# Patient Record
Sex: FEMALE | Race: WHITE | NOT HISPANIC OR LATINO | Employment: FULL TIME | URBAN - METROPOLITAN AREA
[De-identification: names, ages, dates, MRNs, and addresses within clinical notes are randomized per-mention and may not be internally consistent; named-entity substitution may affect disease eponyms.]

---

## 2023-09-06 RX ORDER — CYCLOBENZAPRINE HCL 10 MG
10 TABLET ORAL 3 TIMES DAILY
Qty: 30 TABLET | Refills: 1 | Status: CANCELLED | OUTPATIENT
Start: 2023-09-06

## 2023-09-06 RX ORDER — HYDROXYZINE HYDROCHLORIDE 25 MG/1
25 TABLET, FILM COATED ORAL 3 TIMES DAILY PRN
Qty: 90 TABLET | Refills: 1 | Status: CANCELLED | OUTPATIENT
Start: 2023-09-06

## 2023-10-09 RX ORDER — CYCLOBENZAPRINE HCL 10 MG
10 TABLET ORAL 3 TIMES DAILY
Qty: 30 TABLET | Refills: 1 | Status: CANCELLED | OUTPATIENT
Start: 2023-09-06

## 2023-10-09 RX ORDER — HYDROXYZINE HYDROCHLORIDE 25 MG/1
25 TABLET, FILM COATED ORAL 3 TIMES DAILY PRN
Qty: 90 TABLET | Refills: 1 | Status: CANCELLED | OUTPATIENT
Start: 2023-09-06

## 2024-06-14 ENCOUNTER — OFFICE VISIT (OUTPATIENT)
Dept: GASTROENTEROLOGY | Facility: CLINIC | Age: 49
End: 2024-06-14
Payer: COMMERCIAL

## 2024-06-14 VITALS
DIASTOLIC BLOOD PRESSURE: 78 MMHG | HEIGHT: 59 IN | WEIGHT: 136.2 LBS | SYSTOLIC BLOOD PRESSURE: 112 MMHG | BODY MASS INDEX: 27.46 KG/M2

## 2024-06-14 DIAGNOSIS — K92.1 HEMATOCHEZIA: Primary | ICD-10-CM

## 2024-06-14 DIAGNOSIS — K58.1 IRRITABLE BOWEL SYNDROME WITH CONSTIPATION: ICD-10-CM

## 2024-06-14 DIAGNOSIS — R63.4 UNINTENTIONAL WEIGHT LOSS: ICD-10-CM

## 2024-06-14 RX ORDER — PLECANATIDE 3 MG/1
3 TABLET ORAL DAILY
Qty: 30 TABLET | Refills: 11 | Status: SHIPPED | OUTPATIENT
Start: 2024-06-14

## 2024-06-14 NOTE — PROGRESS NOTES
Marisela Farah is a 48 y.o. female with a past medical history of IBS + noted below who presents for evaluation of   Chief Complaint   Patient presents with    Constipation    Rectal Bleeding       Subjective     # IBS-C   # Hematochezia   # Unintentional Weight Loss   - Reports worsening constipation since 3/2024. Further described as having a BM every 3 days. Has tried OTC laxatives with minimal relief. Reports she is undergoing menopause which she believe may be contributing to her worsening constipation.   - Also reports hematochezia ongoing since 3/2024 further described as bright red blood noted in the toilet bowl and with wiping with toilet paper.   - Endorses associated abdominal bloating.   - Has had unintentional weight loss of 10 lbs.   - Reports her last colonoscopy was at [facility] in 2001.   - Reports her paternal GM was diagnosed with CRC in her 70s.                 Past Medical History:   Diagnosis Date    Anxiety     Clotting disorder 03/24    Hyperlipidemia 2022    Hypertension 2022    Irritable bowel syndrome 2001         Current Outpatient Medications:     atomoxetine (Strattera) 40 MG capsule, Take 1 capsule by mouth Daily for ADHD., Disp: 90 capsule, Rfl: 1    chlorthalidone (HYGROTON) 25 MG tablet, Take 1 tablet by mouth Daily., Disp: 90 tablet, Rfl: 2    clotrimazole-betamethasone (LOTRISONE) 1-0.05 % cream, clotrimazole-betamethasone 1 %-0.05 % topical cream  APPLY TO THE AFFECTED AND SURROUNDING AREAS OF SKIN BY TOPICAL ROUTE 2 TIMES PER DAY IN THE MORNING AND EVENING FOR 2 WEEKS, Disp: , Rfl:     cyclobenzaprine (FLEXERIL) 10 MG tablet, Take 1 tablet by mouth 3 (Three) Times a Day As Needed., Disp: 30 tablet, Rfl: 1    FLUoxetine (PROzac) 40 MG capsule, Take 1 capsule by mouth Daily., Disp: 30 capsule, Rfl: 6    hydrOXYzine (ATARAX) 25 MG tablet, Take 1 tablet by mouth 3 (Three) Times a Day As Needed., Disp: 90 tablet, Rfl: 1    nystatin (MYCOSTATIN) 902283 UNIT/GM powder, nystatin  100,000 unit/gram topical powder  APPLY TO THE AFFECTED AREA(S) BY TOPICAL ROUTE 2 TIMES PER DAY, Disp: , Rfl:     rOPINIRole (REQUIP) 2 MG tablet, Take 1 tablet by mouth every night at bedtime., Disp: 90 tablet, Rfl: 3    ubrogepant (Ubrelvy) 100 MG tablet, Take 1 tablet by mouth at onset of headache. may repeat in 2 hours if needed. max 2 tablets per 24 hours, Disp: 10 tablet, Rfl: 1    valACYclovir (VALTREX) 500 MG tablet, Take 1 tablet by mouth Daily., Disp: 30 tablet, Rfl: 11    Plecanatide (Trulance) 3 MG tablet, Take 1 tablet by mouth Daily., Disp: 30 tablet, Rfl: 11    Allergies   Allergen Reactions    Sulfa Antibiotics Rash and Itching       Social History     Socioeconomic History    Marital status:    Tobacco Use    Smoking status: Never     Passive exposure: Never    Smokeless tobacco: Never    Tobacco comments:     NA   Vaping Use    Vaping status: Never Used   Substance and Sexual Activity    Alcohol use: No    Drug use: Never    Sexual activity: Yes     Partners: Male     Birth control/protection: None       Family History   Problem Relation Age of Onset    Colon polyps Maternal Grandfather     Colon cancer Paternal Grandmother         Colon cancer age 67       Objective     Vitals:    06/14/24 1558   BP: 112/78         06/14/24  1558   Weight: 61.8 kg (136 lb 3.2 oz)     Body mass index is 27.51 kg/m².    Physical Exam  Vitals reviewed.   Constitutional:       Appearance: Normal appearance.   HENT:      Head: Normocephalic and atraumatic.   Eyes:      Extraocular Movements: Extraocular movements intact.      Conjunctiva/sclera: Conjunctivae normal.   Cardiovascular:      Rate and Rhythm: Normal rate and regular rhythm.      Heart sounds: Normal heart sounds.   Pulmonary:      Effort: Pulmonary effort is normal.      Breath sounds: Normal breath sounds.   Abdominal:      General: Abdomen is flat. Bowel sounds are normal. There is no distension.      Palpations: Abdomen is soft.      Tenderness:  There is no abdominal tenderness.   Neurological:      Mental Status: She is alert.   Psychiatric:         Mood and Affect: Mood normal.         Behavior: Behavior normal.         WBC   Date Value Ref Range Status   11/16/2020 10.12 4.5 - 11.0 10*3/uL Final     RBC   Date Value Ref Range Status   11/16/2020 4.38 4.0 - 5.2 10*6/uL Final     Hemoglobin   Date Value Ref Range Status   11/16/2020 14.0 12.0 - 16.0 g/dL Final     Hematocrit   Date Value Ref Range Status   11/16/2020 40.6 36.0 - 46.0 % Final     MCV   Date Value Ref Range Status   11/16/2020 92.7 80.0 - 100.0 fL Final     MCH   Date Value Ref Range Status   11/16/2020 32.0 26.0 - 34.0 pg Final     MCHC   Date Value Ref Range Status   11/16/2020 34.5 31.0 - 37.0 g/dL Final     RDW   Date Value Ref Range Status   11/16/2020 12.7 12.0 - 16.8 % Final     MPV   Date Value Ref Range Status   11/16/2020 8.6 8.4 - 12.4 fL Final     Platelets   Date Value Ref Range Status   11/16/2020 374 140 - 440 10*3/uL Final     Neutrophil Rel %   Date Value Ref Range Status   11/16/2020 57.7 45 - 80 % Final     Lymphocyte Rel %   Date Value Ref Range Status   11/16/2020 29.3 15 - 50 % Final     Monocyte Rel %   Date Value Ref Range Status   11/16/2020 8.5 0 - 15 % Final     Eosinophil %   Date Value Ref Range Status   11/16/2020 3.5 0 - 7 % Final     Basophil Rel %   Date Value Ref Range Status   11/16/2020 0.7 0 - 2 % Final     Immature Grans %   Date Value Ref Range Status   11/16/2020 0.3 0.0 - 1.0 % Final     Neutrophils Absolute   Date Value Ref Range Status   11/16/2020 5.84 2.0 - 8.8 10*3/uL Final     Lymphocytes Absolute   Date Value Ref Range Status   11/16/2020 2.97 0.7 - 5.5 10*3/uL Final     Monocytes Absolute   Date Value Ref Range Status   11/16/2020 0.86 0.0 - 1.7 10*3/uL Final     Eosinophils Absolute   Date Value Ref Range Status   11/16/2020 0.35 0.0 - 0.8 10*3/uL Final     Basophils Absolute   Date Value Ref Range Status   11/16/2020 0.07 0.0 - 0.2 10*3/uL  "Final     Immature Grans, Absolute   Date Value Ref Range Status   11/16/2020 0.03 0.00 - 0.10 10*3/uL Final     nRBC   Date Value Ref Range Status   11/16/2020 0 0 /100(WBC) Final       No results found for: \"GLUCOSE\", \"NA\", \"K\", \"CO2\", \"CL\", \"ANIONGAP\", \"CREATININE\", \"BUN\", \"BCR\", \"CALCIUM\", \"EGFRIFNONA\", \"ALKPHOS\", \"PROTEINTOT\", \"ALT\", \"AST\", \"BILITOT\", \"ALBUMIN\", \"GLOB\", \"LABIL2\"      Imaging Results (Last 7 Days)       ** No results found for the last 168 hours. **                   Assessment & Plan     Diagnoses and all orders for this visit:    1. Hematochezia (Primary)  Assessment & Plan:  - Schedule colonoscopy to further evaluate     Orders:  -     Case Request; Standing  -     Verify bowel prep was successful; Standing  -     Give tap water enema if bowel prep was insufficient; Standing  -     Obtain Informed Consent; Standing  -     Follow Anesthesia Guidelines / Protocol; Standing  -     Case Request  -     Case Request; Standing  -     Obtain Informed Consent; Standing  -     Follow Anesthesia Guidelines / Protocol; Standing  -     Case Request    2. Irritable bowel syndrome with constipation  Assessment & Plan:  - Start Trulance 3 mg QD       Orders:  -     Plecanatide (Trulance) 3 MG tablet; Take 1 tablet by mouth Daily.  Dispense: 30 tablet; Refill: 11    3. Unintentional weight loss  Assessment & Plan:  - Schedule EGD and colonoscopy to further evaluate     Orders:  -     Case Request; Standing  -     Obtain Informed Consent; Standing  -     Follow Anesthesia Guidelines / Protocol; Standing  -     Case Request      RTC in 3 months     I have discussed the above plan with the patient.  They verbalize understanding and are in agreement with the plan.  They have been advised to contact the office for any questions, concerns, or changes related to their health.        "

## 2024-06-28 ENCOUNTER — ANESTHESIA EVENT (OUTPATIENT)
Dept: PERIOP | Facility: HOSPITAL | Age: 49
End: 2024-06-28
Payer: COMMERCIAL

## 2024-07-03 ENCOUNTER — HOSPITAL ENCOUNTER (OUTPATIENT)
Facility: HOSPITAL | Age: 49
Setting detail: HOSPITAL OUTPATIENT SURGERY
Discharge: HOME | End: 2024-07-03
Attending: STUDENT IN AN ORGANIZED HEALTH CARE EDUCATION/TRAINING PROGRAM | Admitting: STUDENT IN AN ORGANIZED HEALTH CARE EDUCATION/TRAINING PROGRAM
Payer: COMMERCIAL

## 2024-07-03 ENCOUNTER — ANESTHESIA (OUTPATIENT)
Dept: PERIOP | Facility: HOSPITAL | Age: 49
End: 2024-07-03
Payer: COMMERCIAL

## 2024-07-03 VITALS
SYSTOLIC BLOOD PRESSURE: 104 MMHG | BODY MASS INDEX: 26.25 KG/M2 | WEIGHT: 130.2 LBS | HEIGHT: 59 IN | TEMPERATURE: 97.6 F | HEART RATE: 84 BPM | RESPIRATION RATE: 14 BRPM | DIASTOLIC BLOOD PRESSURE: 71 MMHG | OXYGEN SATURATION: 97 %

## 2024-07-03 DIAGNOSIS — K92.1 HEMATOCHEZIA: ICD-10-CM

## 2024-07-03 DIAGNOSIS — R63.4 UNINTENTIONAL WEIGHT LOSS: ICD-10-CM

## 2024-07-03 PROCEDURE — 25010000002 PROPOFOL 200 MG/20ML EMULSION: Performed by: NURSE ANESTHETIST, CERTIFIED REGISTERED

## 2024-07-03 PROCEDURE — 25810000003 LACTATED RINGERS PER 1000 ML: Performed by: NURSE ANESTHETIST, CERTIFIED REGISTERED

## 2024-07-03 RX ORDER — LINACLOTIDE 145 UG/1
145 CAPSULE, GELATIN COATED ORAL
Qty: 30 CAPSULE | Refills: 5 | Status: SHIPPED | OUTPATIENT
Start: 2024-07-03 | End: 2024-07-05

## 2024-07-03 RX ORDER — SODIUM CHLORIDE 0.9 % (FLUSH) 0.9 %
10 SYRINGE (ML) INJECTION EVERY 12 HOURS SCHEDULED
Status: DISCONTINUED | OUTPATIENT
Start: 2024-07-03 | End: 2024-07-03 | Stop reason: HOSPADM

## 2024-07-03 RX ORDER — PROPOFOL 10 MG/ML
INJECTION, EMULSION INTRAVENOUS AS NEEDED
Status: DISCONTINUED | OUTPATIENT
Start: 2024-07-03 | End: 2024-07-03 | Stop reason: SURG

## 2024-07-03 RX ORDER — ONDANSETRON 2 MG/ML
4 INJECTION INTRAMUSCULAR; INTRAVENOUS ONCE AS NEEDED
Status: DISCONTINUED | OUTPATIENT
Start: 2024-07-03 | End: 2024-07-03 | Stop reason: HOSPADM

## 2024-07-03 RX ORDER — SODIUM CHLORIDE 9 MG/ML
40 INJECTION, SOLUTION INTRAVENOUS AS NEEDED
Status: DISCONTINUED | OUTPATIENT
Start: 2024-07-03 | End: 2024-07-03 | Stop reason: HOSPADM

## 2024-07-03 RX ORDER — CHLORTHALIDONE 25 MG/1
25 TABLET ORAL DAILY
COMMUNITY

## 2024-07-03 RX ORDER — LIDOCAINE HYDROCHLORIDE 20 MG/ML
INJECTION, SOLUTION INFILTRATION; PERINEURAL AS NEEDED
Status: DISCONTINUED | OUTPATIENT
Start: 2024-07-03 | End: 2024-07-03 | Stop reason: SURG

## 2024-07-03 RX ORDER — SODIUM CHLORIDE 0.9 % (FLUSH) 0.9 %
10 SYRINGE (ML) INJECTION AS NEEDED
Status: DISCONTINUED | OUTPATIENT
Start: 2024-07-03 | End: 2024-07-03 | Stop reason: HOSPADM

## 2024-07-03 RX ORDER — SODIUM CHLORIDE, SODIUM LACTATE, POTASSIUM CHLORIDE, CALCIUM CHLORIDE 600; 310; 30; 20 MG/100ML; MG/100ML; MG/100ML; MG/100ML
100 INJECTION, SOLUTION INTRAVENOUS ONCE
Status: DISCONTINUED | OUTPATIENT
Start: 2024-07-03 | End: 2024-07-03 | Stop reason: HOSPADM

## 2024-07-03 RX ORDER — SODIUM CHLORIDE, SODIUM LACTATE, POTASSIUM CHLORIDE, CALCIUM CHLORIDE 600; 310; 30; 20 MG/100ML; MG/100ML; MG/100ML; MG/100ML
9 INJECTION, SOLUTION INTRAVENOUS CONTINUOUS
Status: DISCONTINUED | OUTPATIENT
Start: 2024-07-03 | End: 2024-07-03 | Stop reason: HOSPADM

## 2024-07-03 RX ORDER — LIDOCAINE HYDROCHLORIDE 10 MG/ML
0.5 INJECTION, SOLUTION INFILTRATION; PERINEURAL ONCE AS NEEDED
Status: DISCONTINUED | OUTPATIENT
Start: 2024-07-03 | End: 2024-07-03 | Stop reason: HOSPADM

## 2024-07-03 RX ADMIN — PROPOFOL 50 MG: 10 INJECTION, EMULSION INTRAVENOUS at 13:55

## 2024-07-03 RX ADMIN — PROPOFOL 50 MG: 10 INJECTION, EMULSION INTRAVENOUS at 14:07

## 2024-07-03 RX ADMIN — PROPOFOL 50 MG: 10 INJECTION, EMULSION INTRAVENOUS at 14:00

## 2024-07-03 RX ADMIN — PROPOFOL 50 MG: 10 INJECTION, EMULSION INTRAVENOUS at 13:58

## 2024-07-03 RX ADMIN — LIDOCAINE HYDROCHLORIDE 80 MG: 20 INJECTION, SOLUTION INFILTRATION; PERINEURAL at 13:51

## 2024-07-03 RX ADMIN — PROPOFOL 50 MG: 10 INJECTION, EMULSION INTRAVENOUS at 14:05

## 2024-07-03 RX ADMIN — PROPOFOL 100 MG: 10 INJECTION, EMULSION INTRAVENOUS at 13:51

## 2024-07-03 RX ADMIN — PROPOFOL 50 MG: 10 INJECTION, EMULSION INTRAVENOUS at 14:02

## 2024-07-03 RX ADMIN — PROPOFOL 50 MG: 10 INJECTION, EMULSION INTRAVENOUS at 13:53

## 2024-07-03 RX ADMIN — SODIUM CHLORIDE, POTASSIUM CHLORIDE, SODIUM LACTATE AND CALCIUM CHLORIDE 9 ML/HR: 600; 310; 30; 20 INJECTION, SOLUTION INTRAVENOUS at 10:52

## 2024-07-03 NOTE — ANESTHESIA PREPROCEDURE EVALUATION
Anesthesia Evaluation     Patient summary reviewed and Nursing notes reviewed   no history of anesthetic complications:   NPO Solid Status: > 8 hours  NPO Liquid Status: > 8 hours           Airway   Mallampati: II  TM distance: >3 FB  Neck ROM: full  No difficulty expected  Dental - normal exam     Pulmonary - negative pulmonary ROS and normal exam    breath sounds clear to auscultation  Cardiovascular - normal exam  Exercise tolerance: good (4-7 METS)    Rhythm: regular  Rate: normal    (+) hypertension  (-) hyperlipidemia      Neuro/Psych  (+) psychiatric history Anxiety and Depression  GI/Hepatic/Renal/Endo    (+) GERD    Musculoskeletal (-) negative ROS    Abdominal  - normal exam   Substance History - negative use     OB/GYN negative ob/gyn ROS         Other - negative ROS                         Anesthesia Plan    ASA 2     MAC     intravenous induction     Anesthetic plan, risks, benefits, and alternatives have been provided, discussed and informed consent has been obtained with: patient.  Pre-procedure education provided  Use of blood products discussed with patient  Consented to blood products.    Plan discussed with CRNA.        CODE STATUS:

## 2024-07-03 NOTE — ANESTHESIA POSTPROCEDURE EVALUATION
Patient: April ENRIQUE Farah    Procedure Summary       Date: 07/03/24 Room / Location: [facility] ENDOSCOPY 2 /  [facility] OR    Anesthesia Start: 1345 Anesthesia Stop: 1413    Procedures:       ESOPHAGOGASTRODUODENOSCOPY (Esophagus)      COLONOSCOPY Diagnosis:       Hematochezia      Unintentional weight loss      (Hematochezia [K92.1])      (Unintentional weight loss [R63.4])    Surgeons: Fernando Rausch MD Provider: Nadya Stevenson CRNA    Anesthesia Type: MAC ASA Status: 2            Anesthesia Type: MAC    Vitals  Vitals Value Taken Time   /69 07/03/24 1440   Temp 97.6 °F (36.4 °C) 07/03/24 1414   Pulse 84 07/03/24 1450   Resp 11 07/03/24 1414   SpO2 97 % 07/03/24 1450   Vitals shown include unfiled device data.        Post Anesthesia Care and Evaluation    Patient location during evaluation: PHASE II  Patient participation: complete - patient participated  Level of consciousness: awake and alert  Pain score: 0  Pain management: adequate    Airway patency: patent  Anesthetic complications: No anesthetic complications  PONV Status: none  Cardiovascular status: acceptable  Respiratory status: acceptable  Hydration status: acceptable

## 2024-07-03 NOTE — H&P
Patient Care Team:  Yolanda Comer APRN as PCP - General (Family Medicine)    CHIEF COMPLAINT:  unintentional weight loss & hematochezia.    HISTORY OF PRESENT ILLNESS:  EGD for unintentional weight loss.   C/s for unintentional weight loss & hematochezia.    Past Medical History:   Diagnosis Date    Anxiety     Hyperlipidemia 2022    Hypertension 2022    Irritable bowel syndrome 2001     Past Surgical History:   Procedure Laterality Date    COLONOSCOPY  2001    JOINT REPLACEMENT       Family History   Problem Relation Age of Onset    Colon polyps Maternal Grandfather     Colon cancer Paternal Grandmother         Colon cancer age 67     Social History     Tobacco Use    Smoking status: Never     Passive exposure: Never    Smokeless tobacco: Never    Tobacco comments:     NA   Vaping Use    Vaping status: Never Used   Substance Use Topics    Alcohol use: No    Drug use: Never     Medications Prior to Admission   Medication Sig Dispense Refill Last Dose    chlorthalidone (HYGROTON) 25 MG tablet Take 1 tablet by mouth Daily.   7/3/2024    cyclobenzaprine (FLEXERIL) 10 MG tablet Take 1 tablet by mouth 3 (Three) Times a Day As Needed. 30 tablet 1 Past Month    FLUoxetine (PROzac) 40 MG capsule Take 1 capsule by mouth Daily. 30 capsule 6 7/3/2024    hydrOXYzine (ATARAX) 25 MG tablet Take 1 tablet by mouth 3 (Three) Times a Day As Needed. 90 tablet 1 Past Month    ubrogepant (Ubrelvy) 100 MG tablet Take 1 tablet by mouth at onset of headache. may repeat in 2 hours if needed. max 2 tablets per 24 hours 10 tablet 1 Past Week    valACYclovir (VALTREX) 500 MG tablet Take 1 tablet by mouth Daily. 30 tablet 11 Past Month    Viloxazine HCl ER (Qelbree) 200 MG capsule sustained-release 24 hr Take 1 capsule by mouth Daily for ADHD. 30 capsule 0 7/3/2024    chlorthalidone (HYGROTON) 25 MG tablet Take 1 tablet by mouth Daily. 90 tablet 2     clotrimazole-betamethasone (LOTRISONE) 1-0.05 % cream clotrimazole-betamethasone 1 %-0.05 %  "topical cream   APPLY TO THE AFFECTED AND SURROUNDING AREAS OF SKIN BY TOPICAL ROUTE 2 TIMES PER DAY IN THE MORNING AND EVENING FOR 2 WEEKS       nystatin (MYCOSTATIN) 597648 UNIT/GM powder nystatin 100,000 unit/gram topical powder   APPLY TO THE AFFECTED AREA(S) BY TOPICAL ROUTE 2 TIMES PER DAY       Plecanatide (Trulance) 3 MG tablet Take 1 tablet by mouth Daily. 30 tablet 11 6/26/2024    rOPINIRole (REQUIP) 2 MG tablet Take 1 tablet by mouth every night at bedtime. 90 tablet 3 7/1/2024     Allergies:  Sulfa antibiotics    REVIEW OF SYSTEMS:  Please see the above history of present illness for pertinent positives and negatives.  The remainder of the patient's systems have been reviewed and are negative.     Vital Signs  Temp:  [97.8 °F (36.6 °C)] 97.8 °F (36.6 °C)  Heart Rate:  [99] 99  Resp:  [12] 12  BP: (129)/(95) 129/95    Flowsheet Rows      Flowsheet Row First Filed Value   Admission Height 149.9 cm (59\") Documented at 07/03/2024 1034   Admission Weight 59.1 kg (130 lb 3.2 oz) Documented at 07/03/2024 1034             Physical Exam:  Physical Exam   Constitutional: Patient appears well-developed and well-nourished and in no acute distress   HEENT:   Head: Normocephalic and atraumatic.   Eyes:  Pupils are equal, round, and reactive to light. EOM are intact. Sclerae are anicteric and non-injected.  Mouth and Throat: Patient has moist mucous membranes. Oropharynx is clear of any erythema or exudate.     Neck: Neck supple. No JVD present. No thyromegaly present. No lymphadenopathy present.  Cardiovascular: Regular rate, regular rhythm, S1 normal and S2 normal.  Exam reveals no gallop and no friction rub.  No murmur heard.  Pulmonary/Chest: Lungs are clear to auscultation bilaterally. No respiratory distress. No wheezes. No rhonchi. No rales.   Abdominal: Soft. Bowel sounds are normal. No distension and no mass. There is no hepatosplenomegaly. There is no tenderness.   Musculoskeletal: Normal Muscle " tone  Extremities: No edema. Pulses are palpable in all 4 extremities.  Neurological: Patient is alert and oriented to person, place, and time. Cranial nerves II-XII are grossly intact with no focal deficits.  Skin: Skin is warm. No rash noted. Nails show no clubbing.  No cyanosis or erythema.    Debilities/Disabilities Identified: None  Emotional Behavior: Appropriate     Results Review:   I reviewed the patient's new clinical results.    Lab Results (most recent)       None            Imaging Results (Most Recent)       None          reviewed    ECG/EMG Results (most recent)       None          reviewed    Assessment & Plan   unintentional weight loss & hematochezia. /  EGD and colonoscopy      I discussed the patient's findings and my recommendations with patient.     Fernando Rausch MD  07/03/24  13:49 EDT    Time: 10 min prior to procedure.

## 2024-07-05 ENCOUNTER — TELEPHONE (OUTPATIENT)
Dept: GASTROENTEROLOGY | Facility: CLINIC | Age: 49
End: 2024-07-05
Payer: COMMERCIAL

## 2024-07-05 DIAGNOSIS — K58.1 IRRITABLE BOWEL SYNDROME WITH CONSTIPATION: Primary | ICD-10-CM

## 2024-07-05 RX ORDER — TENAPANOR HYDROCHLORIDE 53.2 MG/1
50 TABLET ORAL 2 TIMES DAILY
Qty: 60 TABLET | Refills: 11 | Status: SHIPPED | OUTPATIENT
Start: 2024-07-05

## 2024-07-05 NOTE — TELEPHONE ENCOUNTER
Saw Dr. Rausch on 07/03/2024 for EGD.  Linzess was give, but cost too much.  Did give Trulance, but again cost too much.  Would like 3rd option, please. Pharmacy information is correct.  Please review.

## 2024-07-08 NOTE — TELEPHONE ENCOUNTER
Called pt to  samples: try samples then will need to change [facility] after trial    LOT [id number]   Exp:12/31/1924 total 36 tabs

## 2024-07-18 ENCOUNTER — APPOINTMENT (OUTPATIENT)
Dept: GENERAL RADIOLOGY | Facility: HOSPITAL | Age: 49
End: 2024-07-18
Payer: COMMERCIAL

## 2024-07-18 ENCOUNTER — HOSPITAL ENCOUNTER (EMERGENCY)
Facility: HOSPITAL | Age: 49
Discharge: HOME | End: 2024-07-18
Attending: EMERGENCY MEDICINE
Payer: COMMERCIAL

## 2024-07-18 ENCOUNTER — APPOINTMENT (OUTPATIENT)
Dept: CT IMAGING | Facility: HOSPITAL | Age: 49
End: 2024-07-18
Payer: COMMERCIAL

## 2024-07-18 VITALS
HEART RATE: 79 BPM | SYSTOLIC BLOOD PRESSURE: 133 MMHG | RESPIRATION RATE: 16 BRPM | DIASTOLIC BLOOD PRESSURE: 88 MMHG | OXYGEN SATURATION: 99 % | TEMPERATURE: 98 F

## 2024-07-18 DIAGNOSIS — S09.90XA CLOSED HEAD INJURY, INITIAL ENCOUNTER: ICD-10-CM

## 2024-07-18 DIAGNOSIS — R55 SYNCOPE, UNSPECIFIED SYNCOPE TYPE: Primary | ICD-10-CM

## 2024-07-18 DIAGNOSIS — E87.6 HYPOKALEMIA: ICD-10-CM

## 2024-07-18 DIAGNOSIS — S50.01XA CONTUSION OF RIGHT ELBOW, INITIAL ENCOUNTER: ICD-10-CM

## 2024-07-18 LAB
ALBUMIN SERPL-MCNC: 4.2 G/DL (ref 3.5–5.2)
ALBUMIN/GLOB SERPL: 1.1 G/DL
ALP SERPL-CCNC: 126 U/L (ref 39–117)
ALT SERPL W P-5'-P-CCNC: 37 U/L (ref 1–33)
ANION GAP SERPL CALCULATED.3IONS-SCNC: 13.7 MMOL/L (ref 5–15)
AST SERPL-CCNC: 42 U/L (ref 1–32)
BASOPHILS # BLD AUTO: 0.04 10*3/MM3 (ref 0–0.2)
BASOPHILS NFR BLD AUTO: 0.4 % (ref 0–1.5)
BILIRUB SERPL-MCNC: 0.5 MG/DL (ref 0–1.2)
BUN SERPL-MCNC: 23 MG/DL (ref 6–20)
BUN/CREAT SERPL: 20.4 (ref 7–25)
CALCIUM SPEC-SCNC: 9.7 MG/DL (ref 8.6–10.5)
CHLORIDE SERPL-SCNC: 97 MMOL/L (ref 98–107)
CO2 SERPL-SCNC: 28.3 MMOL/L (ref 22–29)
CREAT SERPL-MCNC: 1.13 MG/DL (ref 0.57–1)
DEPRECATED RDW RBC AUTO: 47.9 FL (ref 37–54)
EGFRCR SERPLBLD CKD-EPI 2021: 60.1 ML/MIN/1.73
EOSINOPHIL # BLD AUTO: 0.18 10*3/MM3 (ref 0–0.4)
EOSINOPHIL NFR BLD AUTO: 1.7 % (ref 0.3–6.2)
ERYTHROCYTE [DISTWIDTH] IN BLOOD BY AUTOMATED COUNT: 14.4 % (ref 12.3–15.4)
GLOBULIN UR ELPH-MCNC: 3.8 GM/DL
GLUCOSE BLDC GLUCOMTR-MCNC: 138 MG/DL (ref 70–130)
GLUCOSE SERPL-MCNC: 163 MG/DL (ref 65–99)
HCT VFR BLD AUTO: 44.4 % (ref 34–46.6)
HGB BLD-MCNC: 15.1 G/DL (ref 12–15.9)
IMM GRANULOCYTES # BLD AUTO: 0.02 10*3/MM3 (ref 0–0.05)
IMM GRANULOCYTES NFR BLD AUTO: 0.2 % (ref 0–0.5)
LYMPHOCYTES # BLD AUTO: 3.8 10*3/MM3 (ref 0.7–3.1)
LYMPHOCYTES NFR BLD AUTO: 36.3 % (ref 19.6–45.3)
MAGNESIUM SERPL-MCNC: 2.1 MG/DL (ref 1.6–2.6)
MCH RBC QN AUTO: 30.6 PG (ref 26.6–33)
MCHC RBC AUTO-ENTMCNC: 34 G/DL (ref 31.5–35.7)
MCV RBC AUTO: 89.9 FL (ref 79–97)
MONOCYTES # BLD AUTO: 0.71 10*3/MM3 (ref 0.1–0.9)
MONOCYTES NFR BLD AUTO: 6.8 % (ref 5–12)
NEUTROPHILS NFR BLD AUTO: 5.72 10*3/MM3 (ref 1.7–7)
NEUTROPHILS NFR BLD AUTO: 54.6 % (ref 42.7–76)
PLATELET # BLD AUTO: 387 10*3/MM3 (ref 140–450)
PMV BLD AUTO: 8.8 FL (ref 6–12)
POTASSIUM SERPL-SCNC: 2.4 MMOL/L (ref 3.5–5.2)
PROT SERPL-MCNC: 8 G/DL (ref 6–8.5)
QT INTERVAL: 413 MS
QTC INTERVAL: 489 MS
RBC # BLD AUTO: 4.94 10*6/MM3 (ref 3.77–5.28)
SODIUM SERPL-SCNC: 139 MMOL/L (ref 136–145)
WBC NRBC COR # BLD AUTO: 10.47 10*3/MM3 (ref 3.4–10.8)

## 2024-07-18 PROCEDURE — 83735 ASSAY OF MAGNESIUM: CPT | Performed by: EMERGENCY MEDICINE

## 2024-07-18 PROCEDURE — 25810000003 LACTATED RINGERS SOLUTION: Performed by: EMERGENCY MEDICINE

## 2024-07-18 PROCEDURE — 96374 THER/PROPH/DIAG INJ IV PUSH: CPT

## 2024-07-18 PROCEDURE — 73070 X-RAY EXAM OF ELBOW: CPT

## 2024-07-18 PROCEDURE — 99284 EMERGENCY DEPT VISIT MOD MDM: CPT

## 2024-07-18 PROCEDURE — 85025 COMPLETE CBC W/AUTO DIFF WBC: CPT | Performed by: EMERGENCY MEDICINE

## 2024-07-18 PROCEDURE — 70450 CT HEAD/BRAIN W/O DYE: CPT

## 2024-07-18 PROCEDURE — 25010000002 KETOROLAC TROMETHAMINE PER 15 MG: Performed by: EMERGENCY MEDICINE

## 2024-07-18 PROCEDURE — 93005 ELECTROCARDIOGRAM TRACING: CPT | Performed by: EMERGENCY MEDICINE

## 2024-07-18 PROCEDURE — 82948 REAGENT STRIP/BLOOD GLUCOSE: CPT

## 2024-07-18 PROCEDURE — 80053 COMPREHEN METABOLIC PANEL: CPT | Performed by: EMERGENCY MEDICINE

## 2024-07-18 RX ORDER — POTASSIUM CHLORIDE 20 MEQ/1
20 TABLET, EXTENDED RELEASE ORAL EVERY 12 HOURS
Qty: 14 TABLET | Refills: 0 | Status: SHIPPED | OUTPATIENT
Start: 2024-07-18 | End: 2024-07-24 | Stop reason: SDUPTHER

## 2024-07-18 RX ORDER — SODIUM CHLORIDE 0.9 % (FLUSH) 0.9 %
10 SYRINGE (ML) INJECTION AS NEEDED
Status: DISCONTINUED | OUTPATIENT
Start: 2024-07-18 | End: 2024-07-18 | Stop reason: HOSPADM

## 2024-07-18 RX ORDER — KETOROLAC TROMETHAMINE 30 MG/ML
15 INJECTION, SOLUTION INTRAMUSCULAR; INTRAVENOUS ONCE
Status: COMPLETED | OUTPATIENT
Start: 2024-07-18 | End: 2024-07-18

## 2024-07-18 RX ORDER — POTASSIUM CHLORIDE 20 MEQ/1
40 TABLET, EXTENDED RELEASE ORAL ONCE
Status: COMPLETED | OUTPATIENT
Start: 2024-07-18 | End: 2024-07-18

## 2024-07-18 RX ADMIN — SODIUM CHLORIDE, POTASSIUM CHLORIDE, SODIUM LACTATE AND CALCIUM CHLORIDE 1000 ML: 600; 310; 30; 20 INJECTION, SOLUTION INTRAVENOUS at 08:54

## 2024-07-18 RX ADMIN — POTASSIUM CHLORIDE 40 MEQ: 1500 TABLET, EXTENDED RELEASE ORAL at 09:47

## 2024-07-18 RX ADMIN — KETOROLAC TROMETHAMINE 15 MG: 30 INJECTION, SOLUTION INTRAMUSCULAR; INTRAVENOUS at 09:12

## 2024-07-18 NOTE — ED PROVIDER NOTES
Subjective   History of Present Illness  Patient presents after having a passing out episode just prior to arrival.  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  Patient says she had minimal to eat yesterday and then did not have anything this morning.  Patient says she went to the bathroom started feeling very nauseous and then got clammy and passed out.  Patient says she hit the right side of her head on the back as well as her right elbow.  Patient says she was only probably unconscious for less than 10 seconds and then immediately woke up.  Patient was able to get up and notify staff.  Rapid response was called and they brought the patient here.  Patient did vomit once after waking up.  No blood in her emesis.  Patient is otherwise been at her baseline health recently but has had some recent medication changes to do with her diabetes.  Patient's spouse is here.      Review of Systems   All other systems reviewed and are negative.      Past Medical History:   Diagnosis Date    Anxiety     Hyperlipidemia 2022    Hypertension 2022    Irritable bowel syndrome 2001       Allergies   Allergen Reactions    Sulfa Antibiotics Rash and Itching       Past Surgical History:   Procedure Laterality Date    COLONOSCOPY  2001    COLONOSCOPY N/A 7/3/2024    Procedure: COLONOSCOPY;  Surgeon: Fernando Rausch MD;  Location: [facility] OR;  Service: Gastroenterology;  Laterality: N/A;  external hemmoroids    ENDOSCOPY N/A 7/3/2024    Procedure: ESOPHAGOGASTRODUODENOSCOPY;  Surgeon: Fernando Rausch MD;  Location: [facility] OR;  Service: Gastroenterology;  Laterality: N/A;    JOINT REPLACEMENT         Family History   Problem Relation Age of Onset    Colon polyps Maternal Grandfather     Colon cancer Paternal Grandmother         Colon cancer age 67       Social History     Socioeconomic History    Marital status:    Tobacco Use    Smoking status: Never     Passive exposure: Never    Smokeless  tobacco: Never    Tobacco comments:     NA   Vaping Use    Vaping status: Never Used   Substance and Sexual Activity    Alcohol use: No    Drug use: Never    Sexual activity: Yes     Partners: Male     Birth control/protection: None           Objective   Physical Exam  Vitals and nursing note reviewed.   Constitutional:       Comments: Patient sitting in bed comfortably, talkative, friendly, no signs of distress.  Cooperative with exam.   HENT:      Head: Normocephalic.        Comments: Small tender area of swelling on the right posterior parietal region.     Right Ear: External ear normal.      Left Ear: External ear normal.      Nose: Nose normal.      Mouth/Throat:      Mouth: Mucous membranes are moist.      Pharynx: Oropharynx is clear.   Eyes:      Extraocular Movements: Extraocular movements intact.      Conjunctiva/sclera: Conjunctivae normal.      Pupils: Pupils are equal, round, and reactive to light.   Neck:      Comments: No cervical, thoracic, or lumbar spinal tenderness to palpation.  Cardiovascular:      Rate and Rhythm: Normal rate and regular rhythm.      Pulses:           Radial pulses are 2+ on the right side and 2+ on the left side.        Dorsalis pedis pulses are 2+ on the right side and 2+ on the left side.        Posterior tibial pulses are 2+ on the right side and 2+ on the left side.      Heart sounds: Normal heart sounds, S1 normal and S2 normal.   Pulmonary:      Effort: Pulmonary effort is normal.      Breath sounds: Normal breath sounds.   Abdominal:      General: Abdomen is flat.      Palpations: Abdomen is soft.   Musculoskeletal:      Cervical back: Neck supple.      Right lower leg: No edema.      Left lower leg: No edema.      Comments: Mild swelling to the right elbow.  Tender to palpation.  Patient has full range of motion of the right shoulder and wrist but is limited secondary to pain at the elbow.  Patient can fully extend but flex only to about 100 degrees   Skin:      General: Skin is warm and dry.      Capillary Refill: Capillary refill takes 2 to 3 seconds.   Neurological:      Mental Status: She is alert and oriented to person, place, and time.      Sensory: No sensory deficit.      Motor: No weakness.      Coordination: Coordination normal.   Psychiatric:         Mood and Affect: Mood normal.         Behavior: Behavior normal.         Procedures           ED Course  ED Course as of 07/18/24 1029   Thu Jul 18, 2024   0845 EKG-rate of 84, sinus rhythm, normal axis, no acute ST elevation or depression. [AW]      ED Course User Index  [AW] Sher English MD                                             Medical Decision Making  Ddx low blood sugar, dehydration, dysrhythmia, electrolyte abnormality, orthostatic hypotension    Labs Reviewed  COMPREHENSIVE METABOLIC PANEL - Abnormal; Notable for the following components:     Glucose                       163 (*)                BUN                           23 (*)                 Creatinine                    1.13 (*)               Potassium                     2.4 (*)                Chloride                      97 (*)                 ALT (SGPT)                    37 (*)                 AST (SGOT)                    42 (*)                 Alkaline Phosphatase          126 (*)             All other components within normal limits         Narrative: GFR Normal >60                  Chronic Kidney Disease <60                  Kidney Failure <15                    CBC WITH AUTO DIFFERENTIAL - Abnormal; Notable for the following components:     Lymphocytes, Absolute         3.80 (*)            All other components within normal limits  POCT GLUCOSE FINGERSTICK - Abnormal; Notable for the following components:     Glucose                       138 (*)             All other components within normal limits  MAGNESIUM - Normal  CBC AND DIFFERENTIAL    CT Head Without Contrast    Result Date: 7/18/2024  Impression: No acute intracranial  abnormality Electronically Signed: Jax Morales MD  7/18/2024 9:57 AM EDT  Workstation ID: [id number]    XR Elbow 2 View Right    Result Date: 7/18/2024  Impression: Negative right elbow Electronically Signed: Eduardo Ca MD  7/18/2024 9:31 AM EDT  Workstation ID: [id number]    1020 Pt seen again prior to d/c.  Labs/Imaging reviewed and are unremarkable.  Symptoms improved and pt feels better, vitals stable and pt. in NAD. Non-toxic. Comfortable. Ambulating without difficulty.  Tolerating po.  Relaxed breathing.  All questions personally answered at the bedside and all d/c instructions personally reviewed with pt.  Discussed the importance of close outpt. f/u and pt. understands this and agrees to do so.  Pt agrees to return to ED immediately for any new, persistent, or worsening symptoms.    EMR Dragon/Transcription disclaimer:  Much of this encounter note is an electronic transcription/translation of spoken language to printed text, aka voice recognition.  The electronic translation of spoken language may permit erroneous or at times nonsensical words or phrases to be inadvertently transcribed; although I have reviewed the note for such errors, some may still exist so please interpret based on surrounding text content.      Amount and/or Complexity of Data Reviewed  Labs: ordered.  Radiology: ordered.  ECG/medicine tests: ordered.    Risk  Prescription drug management.        Final diagnoses:   Syncope, unspecified syncope type   Hypokalemia   Closed head injury, initial encounter   Contusion of right elbow, initial encounter       ED Disposition  ED Disposition       ED Disposition   Discharge    Condition   Stable    Comment   --               Yolanda Comer, APRN  [address] [facility] [address] [phone]    In 3 days  If symptoms worsen         Medication List        New Prescriptions      potassium chloride 20 MEQ CR tablet  Commonly known as: KLOR-CON M20  Take 1 tablet by mouth Every 12  (Twelve) Hours for 7 days.               Where to Get Your Medications        These medications were sent to [facility]  [address] [facility] [address]      Hours: Monday to Friday 7 AM to 5 PM Phone: [phone]   potassium chloride 20 MEQ CR tablet            Sher English MD  07/18/24 3591

## 2024-07-18 NOTE — Clinical Note
BESSY FLAHERTY  [facility] EMERGENCY DEPARTMENT  [address] [place] [address]  Phone: [phone]    April Gagan was seen and treated in our emergency department on 7/18/2024.  She may return to work on 07/22/2024.         Thank you for choosing [facility].    Sher English MD

## 2024-07-18 NOTE — Clinical Note
BESSY FLAHERTY  [facility] EMERGENCY DEPARTMENT  [address] [place] [address]  Phone: [phone]    April Gagan was seen and treated in our emergency department on 7/18/2024.  She may return to work on 07/22/2024.         Thank you for choosing [facility].    Sher English MD

## 2024-07-18 NOTE — Clinical Note
BESSY FLAHERTY  [facility] EMERGENCY DEPARTMENT  [address] [address]  Phone: [phone]    April Gagan was seen and treated in our emergency department on 7/18/2024.  She may return to work on 07/22/2024.         Thank you for choosing [facility].    Sher English MD

## 2024-08-29 ENCOUNTER — SPECIALTY PHARMACY (OUTPATIENT)
Dept: PHARMACY | Facility: TELEHEALTH | Age: 49
End: 2024-08-29
Payer: COMMERCIAL

## 2024-08-29 PROBLEM — G43.909 MIGRAINE: Status: ACTIVE | Noted: 2024-08-29

## 2024-08-29 NOTE — PROGRESS NOTES
Specialty Pharmacy Patient Management Program  Initial Assessment     Marisela Farah is a 49 y.o. female with chronic migraine and enrolled in the Patient Management program offered by [facility]. An initial outreach was conducted, including assessment of therapy appropriateness and specialty medication education for Ubrelvy 100mg tablets. The patient was introduced to services offered by [facility], including: regular assessments, refill coordination, curbside pick-up or mail order delivery options, prior authorization maintenance, and financial assistance programs as applicable. The patient was also provided with contact information for the pharmacy team.     Insurance Coverage & Financial Support  [employer] + Copay card      Relevant Past Medical History and Comorbidities  Relevant medical history and concomitant health conditions were discussed with the patient. The patient's chart has been reviewed for relevant past medical history and comorbid health conditions and updated as necessary.   Past Medical History:   Diagnosis Date    Anxiety     Hyperlipidemia 2022    Hypertension 2022    Irritable bowel syndrome 2001     Social History     Socioeconomic History    Marital status:    Tobacco Use    Smoking status: Never     Passive exposure: Never    Smokeless tobacco: Never    Tobacco comments:     NA   Vaping Use    Vaping status: Never Used   Substance and Sexual Activity    Alcohol use: No    Drug use: Never    Sexual activity: Yes     Partners: Male     Birth control/protection: None     Problem list reviewed by Korin Augustin PharmD on 8/29/2024 at 11:39 AM    Allergies  Known allergies and reactions were discussed with the patient. The patient's chart has been reviewed for allergy information and updated as necessary.   Sulfa antibiotics  Allergies reviewed by Korin Augustin, [place] on 8/29/2024 at 11:38 AM    Current Medication List  This  medication list has been reviewed with the patient and evaluated for any interactions or necessary modifications/recommendations, and updated to include all prescription medications, OTC medications, and supplements the patient is currently taking. This list reflects what is contained in the patient's profile, which has also been marked as reviewed to communicate to other providers it is the most up to date version of the patient's current medication therapy.     Current Outpatient Medications:     chlorthalidone (HYGROTON) 25 MG tablet, Take 1 tablet by mouth Daily., Disp: 90 tablet, Rfl: 2    chlorthalidone (HYGROTON) 25 MG tablet, Take 1 tablet by mouth Daily., Disp: , Rfl:     clotrimazole-betamethasone (LOTRISONE) 1-0.05 % cream, clotrimazole-betamethasone 1 %-0.05 % topical cream  APPLY TO THE AFFECTED AND SURROUNDING AREAS OF SKIN BY TOPICAL ROUTE 2 TIMES PER DAY IN THE MORNING AND EVENING FOR 2 WEEKS, Disp: , Rfl:     cyclobenzaprine (FLEXERIL) 10 MG tablet, Take 1 tablet by mouth 3 (Three) Times a Day As Needed., Disp: 30 tablet, Rfl: 1    FLUoxetine (PROzac) 40 MG capsule, Take 1 capsule by mouth Daily., Disp: 30 capsule, Rfl: 6    hydrOXYzine (ATARAX) 25 MG tablet, Take 1 tablet by mouth 3 (Three) Times a Day As Needed., Disp: 90 tablet, Rfl: 1    nystatin (MYCOSTATIN) 732929 UNIT/GM powder, nystatin 100,000 unit/gram topical powder  APPLY TO THE AFFECTED AREA(S) BY TOPICAL ROUTE 2 TIMES PER DAY, Disp: , Rfl:     potassium chloride (KLOR-CON M20) 20 MEQ CR tablet, Take 2 tablets by mouth Daily., Disp: 60 tablet, Rfl: 3    rOPINIRole (REQUIP) 2 MG tablet, Take 1 tablet by mouth every night at bedtime., Disp: 90 tablet, Rfl: 3    Tenapanor HCl (Ibsrela) 50 MG tablet, Take 1 tablet by mouth 2 (Two) Times a Day., Disp: 60 tablet, Rfl: 11    ubrogepant (Ubrelvy) 100 MG tablet, Take 1 tablet by mouth at onset of headache. may repeat in 2 hours if needed. max 2 tablets per 24 hours, Disp: 10 tablet, Rfl: 1     valACYclovir (VALTREX) 500 MG tablet, Take 1 tablet by mouth Daily., Disp: 30 tablet, Rfl: 11  Medicines reviewed by Korin Augustin, PharmD on 8/29/2024 at 11:39 AM    Drug Interactions  none     Relevant Laboratory Values  Lab Results   Component Value Date    GLUCOSE 163 (H) 07/18/2024    CALCIUM 9.7 07/18/2024     07/18/2024    K 2.4 (C) 07/18/2024    CO2 28.3 07/18/2024    CL 97 (L) 07/18/2024    BUN 23 (H) 07/18/2024    CREATININE 1.13 (H) 07/18/2024    BCR 20.4 07/18/2024    ANIONGAP 13.7 07/18/2024     Lab Results   Component Value Date    WBC 10.47 07/18/2024    HGB 15.1 07/18/2024    HCT 44.4 07/18/2024    MCV 89.9 07/18/2024     07/18/2024     Lab Value Review  The above lab values have been reviewed; the following specialty medication(s) dose adjustment(s) are recommended: none.    Initial Education Provided for Specialty Medication  The patient has been provided with the following education and any applicable administration techniques (i.e. self-injection) have been demonstrated for the therapies indicated. All questions and concerns have been addressed prior to the patient receiving the medication, and the patient has verbalized understanding of the education and any materials provided. Additional patient education shall be provided and documented upon request by the patient, provider or payer.      Ubrelvy (Ubrogepant)  Medication Expectations   Why am I taking this medication? You are taking this medication to treat acute migraines.   What should I expect while on this medication? You should expect to see a decrease in the frequency and severity of your migraines.   How does the medication work? Ubrelvy is a monoclonal antibody that binds to calcitonin gene-related peptide (CGRP) and blocks its binding to the receptor decreasing the severity of migraines.   How long will I be on this medication for? The amount of time you will be on this medication will be determined by your doctor and  your response to the medication.    How do I take this medication? Take as directed on your prescription label.  Reviewed plan for Ubrelvy 100mg (1 tablet) PO daily prn; may repeat x 1 in 2 hours, if needed. Max dosage = 200mg/24 hours.    What are some possible side effects? Potential side effects including, but not limited to nausea and somnolence. Pt verbalized understanding.   What happens if I miss a dose? This is taken as needed for migraines.     Medication Safety   What are things I should warn my doctor immediately about? Allergic reaction: Itching or hives, swelling in your face or hands, swelling or tingling in your mouth or throat, chest tightness, trouble breathing     What are things that I should be cautious of? Tell your doctor if you are pregnant or breastfeeding, or if you have kidney disease or liver disease.   What are some medications that can interact with this one? Concomitant use of strong CY inhibitors, check with your pharmacist or provider before starting any new medications, avoid grapefruit juice.     Medication Storage/Handling   How should I handle this medication? Keep this medication out of reach of pets/children.   How does this medication need to be stored? Store at a controlled room temperature between 20 and 25 degrees C (68 and 77 degrees F), with excursions permitted between 15 and 30 degrees C (59 and 86 degrees F) away from direct sunlight and moisture.   How should I dispose of this medication? There should not be a need to dispose of this medication unless your provider decides to change the dose or therapy. If that is the case, take to your local police station for proper disposal. Some pharmacies also have take-back bins for medication drop-off.      Resources/Support   How can I remind myself to take this medication? This is taken as needed for migraines.   Is financial support available?  Yes, [employer] can provide co-pay cards if you have commercial  insurance or patient assistance if you have Medicare or no insurance.    Which vaccines are recommended for me? Talk to your doctor about these vaccines: Flu, Coronavirus (COVID-19), Pneumococcal (pneumonia), Tdap, Hepatitis B, Zoster (shingles)         Adherence, Self-Administration, and Current Therapy Problems  Adherence related to the patient's specialty therapy was discussed with the patient. The Adherence segment of this outreach has been reviewed and updated.          Additional Barriers to Patient Self-Administration: none  Methods for Supporting Patient Self-Administration: n/a    Open Medication Therapy Problems  No medication therapy recommendations to display    Goals of Therapy   Goals Addressed Today    None         Reassessment Plan & Follow-Up  Medication Therapy Changes: Patient has been taking medication, moved to [facility] and will now be managed by call center  Additional Plans, Therapy Recommendations, or Therapy Problems to Be Addressed: none   Pharmacist to perform regular reassessments no more than (6) months from the previous assessment.  Welcome information and patient satisfaction survey to be sent by retail team with patient's initial fill.  Care Coordinator to set up future refill outreaches, coordinate prescription delivery, and escalate clinical questions to pharmacist.     Attestation  I attest the patient was actively involved in and has agreed to the above plan of care. I attest that the initiated specialty medication(s) are appropriate for the patient based on my assessment. If the prescribed therapy is at any point deemed not appropriate based on the current or future assessments, a consultation will be initiated with the patient's specialty care provider to determine the best course of action. The revised plan of therapy will be documented along with any reassessments and/or additional patient education provided.     Electronically signed by Korin Augustin PharmD, 08/29/24, 11:46 AM  EDT.

## 2024-11-19 ENCOUNTER — SPECIALTY PHARMACY (OUTPATIENT)
Dept: PHARMACY | Facility: TELEHEALTH | Age: 49
End: 2024-11-19
Payer: COMMERCIAL

## 2024-11-19 NOTE — PROGRESS NOTES
Specialty Pharmacy Refill Coordination Note     April is a 49 y.o. female contacted today regarding refills of  Ubrelvy specialty medication(s).    Reviewed and verified with patient:       Specialty medication(s) and dose(s) confirmed: yes    Refill Questions      Flowsheet Row Most Recent Value   Changes to allergies? No   Changes to medications? No   New conditions or infections since last clinic visit No   Unplanned office visit, urgent care, ED, or hospital admission in the last 4 weeks  No   How does patient/caregiver feel medication is working? Very good   Financial problems or insurance changes  No   Since the previous refill, were any specialty medication doses or scheduled injections missed or delayed?  No   Does this patient require a clinical escalation to a pharmacist? No            Delivery Questions      Flowsheet Row Most Recent Value   Delivery method  at Pharmacy   Delivery address verified with patient/caregiver? --  [ at Pharmacy]   Delivery address Other (Comment)  [ at Pharmacy]   Number of medications in delivery 1  [ at Pharmacy]   Medication(s) being filled and delivered Ubrogepant (Ubrelvy)  [ at Pharmacy]   Doses left of specialty medications a few   Copay verified? Yes   Copay amount $0.00   Copay form of payment No copayment ($0)   Ship Date n/a  [ at Pharmacy]   Delivery Date n/a  [ at Pharmacy]   Signature Required No                   Follow-up: 21 day(s)     Judith Reynolds, Pharmacy Technician  Specialty Pharmacy Technician

## 2024-12-23 ENCOUNTER — SPECIALTY PHARMACY (OUTPATIENT)
Dept: PHARMACY | Facility: TELEHEALTH | Age: 49
End: 2024-12-23
Payer: COMMERCIAL

## 2024-12-31 ENCOUNTER — SPECIALTY PHARMACY (OUTPATIENT)
Dept: PHARMACY | Facility: TELEHEALTH | Age: 49
End: 2024-12-31
Payer: COMMERCIAL

## 2025-02-05 ENCOUNTER — SPECIALTY PHARMACY (OUTPATIENT)
Dept: PHARMACY | Facility: TELEHEALTH | Age: 50
End: 2025-02-05
Payer: COMMERCIAL

## 2025-02-05 NOTE — PROGRESS NOTES
Specialty Pharmacy Patient Management Program  Reassessment     Marisela Farah is a 49 y.o. female with chronic migraine and enrolled in the Patient Management program offered by [facility]. A follow-up outreach was conducted, including assessment of continued therapy appropriateness, medication adherence, and side effect incidence and management for Ubrelvy 100 mg tablet..     Changes to Insurance Coverage or Financial Support  [employer]    Relevant Past Medical History and Comorbidities  Relevant medical history and concomitant health conditions were discussed with the patient. The patient's chart has been reviewed for relevant past medical history and comorbid health conditions and updated as necessary.   Past Medical History:   Diagnosis Date    Anxiety     Hyperlipidemia 2022    Hypertension 2022    Irritable bowel syndrome 2001     Social History     Socioeconomic History    Marital status:    Tobacco Use    Smoking status: Never     Passive exposure: Never    Smokeless tobacco: Never    Tobacco comments:     NA   Vaping Use    Vaping status: Never Used   Substance and Sexual Activity    Alcohol use: No    Drug use: Never    Sexual activity: Yes     Partners: Male     Birth control/protection: None     Problem list reviewed by Korin Augustin, PharmD on 2/5/2025 at 12:15 PM    Allergies  Known allergies and reactions were discussed with the patient. The patient's chart has been reviewed for allergy information and updated as necessary.   Allergies   Allergen Reactions    Sulfa Antibiotics Rash and Itching     Allergies reviewed by Korin Augustin, PharmD on 2/5/2025 at 12:15 PM    Relevant Laboratory Values  Lab Results   Component Value Date    GLUCOSE 163 (H) 07/18/2024    CALCIUM 9.7 07/18/2024     07/18/2024    K 2.4 (C) 07/18/2024    CO2 28.3 07/18/2024    CL 97 (L) 07/18/2024    BUN 23 (H) 07/18/2024    CREATININE 1.13 (H) 07/18/2024    BCR 20.4 07/18/2024     ANIONGAP 13.7 07/18/2024     Lab Results   Component Value Date    WBC 10.47 07/18/2024    HGB 15.1 07/18/2024    HCT 44.4 07/18/2024    MCV 89.9 07/18/2024     07/18/2024     Lab Value Review  The above lab values have been reviewed; the following specialty medication(s) dose adjustment(s) are recommended: none.    Current Medication List  This medication list has been reviewed with the patient and evaluated for any interactions or necessary modifications/recommendations, and updated to include all prescription medications, OTC medications, and supplements the patient is currently taking. This list reflects what is contained in the patient's profile, which has also been marked as reviewed to communicate to other providers it is the most up to date version of the patient's current medication therapy.     Current Outpatient Medications:     amoxicillin-clavulanate (AUGMENTIN) 875-125 MG per tablet, Take 1 tablet every 12 hours by oral route., Disp: 20 tablet, Rfl: 0    atorvastatin (LIPITOR) 10 MG tablet, Take 1 tablet by mouth daily for cholesterol., Disp: 90 tablet, Rfl: 3    Chlorcyclizine-Pseudoephed (Stahist AD) 25-60 MG tablet, Take 1 tablet by mouth 3 (Three) Times a Day As Needed., Disp: 30 tablet, Rfl: 0    chlorthalidone (HYGROTON) 25 MG tablet, Take 1 tablet by mouth Daily., Disp: , Rfl:     chlorthalidone (HYGROTON) 25 MG tablet, Take 1 tablet by mouth Daily., Disp: 30 tablet, Rfl: 11    clotrimazole-betamethasone (LOTRISONE) 1-0.05 % cream, clotrimazole-betamethasone 1 %-0.05 % topical cream  APPLY TO THE AFFECTED AND SURROUNDING AREAS OF SKIN BY TOPICAL ROUTE 2 TIMES PER DAY IN THE MORNING AND EVENING FOR 2 WEEKS, Disp: , Rfl:     cyclobenzaprine (FLEXERIL) 10 MG tablet, Take 1 tablet by mouth 3 (Three) Times a Day As Needed., Disp: 30 tablet, Rfl: 1    cyclobenzaprine (FLEXERIL) 10 MG tablet, Take 1 tablet by mouth 3 times a day as needed., Disp: 30 tablet, Rfl: 0    FLUoxetine (PROzac) 40 MG  capsule, Take 1 capsule by mouth Daily., Disp: 30 capsule, Rfl: 6    hydrOXYzine (ATARAX) 25 MG tablet, Take 1 tablet by mouth 3 (Three) Times a Day As Needed., Disp: 90 tablet, Rfl: 1    Lidocaine Viscous HCl (XYLOCAINE) 2 % solution, Take 15 mL by mouth Every 3 (Three) Hours., Disp: 100 mL, Rfl: 1    nystatin (MYCOSTATIN) 190613 UNIT/GM powder, nystatin 100,000 unit/gram topical powder  APPLY TO THE AFFECTED AREA(S) BY TOPICAL ROUTE 2 TIMES PER DAY, Disp: , Rfl:     potassium chloride (KLOR-CON M20) 20 MEQ CR tablet, Take 2 tablets by mouth Daily for low potassium., Disp: 60 tablet, Rfl: 3    rOPINIRole (REQUIP) 2 MG tablet, Take 1 tablet by mouth every night at bedtime., Disp: 90 tablet, Rfl: 3    Tenapanor HCl (Ibsrela) 50 MG tablet, Take 1 tablet by mouth 2 (Two) Times a Day., Disp: 60 tablet, Rfl: 11    ubrogepant (Ubrelvy) 100 MG tablet, Take 1 tablet by mouth at onset of headache. may repeat in 2 hours if needed. max 2 tablets per 24 hours, Disp: 10 tablet, Rfl: 1    ubrogepant (Ubrelvy) 100 MG tablet, take 1 tablet at the onset of headache, repeat x 1 in 2 hours if symptoms unrelieved. Do not exceed more than 2 doses in 2 hours., Disp: 10 tablet, Rfl: 1    ubrogepant (Ubrelvy) 100 MG tablet, take 1 tablet at the onset of headache, repeat x 1 in 2 hours if symptoms unrelieved. Do not exceed more than 2 doses in 2 hours., Disp: 10 tablet, Rfl: 1    valACYclovir (VALTREX) 500 MG tablet, Take 1 tablet by mouth Daily., Disp: 30 tablet, Rfl: 11  Medicines reviewed by Korin Augustin, PharmD on 2/5/2025 at 12:15 PM    Drug Interactions  none     Adverse Drug Reactions  Medication tolerability: Tolerating with no to minimal ADRs  Medication plan: Continue therapy with normal follow-up  Plan for ADR Management: N/A    Hospitalizations and Urgent Care Since Last Assessment  Hospitalizations or Admissions: none  ED Visits: none  Urgent Office Visits: none     Adherence, Self-Administration, and Current Therapy  Problems  Adherence related to the patient's specialty therapy was discussed with the patient. The Adherence segment of this outreach has been reviewed and updated.     Adherence Questions  Linked Medication(s) Assessed: Ubrogepant (Ubrelvy)  On average, how many doses/injections does the patient miss per month?: 0 (PRN)  What are the identified reasons for non-adherence or missed doses? : no problems identified  What is the estimated medication adherence level?: % (PRN)  Based on the patient/caregiver response and refill history, does this patient require an MTP to track adherence improvements?: no    Additional Barriers to Patient Self-Administration: None  Methods for Supporting Patient Self-Administration: N/A    Open Medication Therapy Problems  No medication therapy recommendations to display    Goals of Therapy  Goals related to the patient's specialty therapy were discussed with the patient. The Patient Goals segment of this outreach has been reviewed and updated.   Goals Addressed Today        Specialty Pharmacy General Goal      Decrease frequency, severity and duration of migraine attacks by at least 50%    2/5/25 Patient tolerating medication well, no ADRs to report. She does not have to use often for acute treatment. However, when she does take it works well to decrease duration, severity of migraine.              Progress Toward Meeting Patient-Identified Goals of Therapy: On Track  New Patient-Identified Goals, If Applicable:     Progress Toward Meeting Clinical Goals or Therapeutic Targets: On Track  New Clinical Goals or Therapeutic Targets, If Applicable:     Quality of Life Assessment   Quality of Life related to the patient's enrollment in the patient management program and services provided was discussed with the patient. The QOL segment of this outreach has been reviewed and updated.  Quality of Life Improvement Scale: 8-Moderately better    Reassessment Plan & Follow-Up  Medication  Therapy Changes: none  Additional Plans, Therapy Recommendations, or Therapy Problems to Be Addressed: none   Pharmacist to perform regular reassessments no more than (6) months from the previous assessment.  Care Coordinator to set up future refill outreaches, coordinate prescription delivery, and escalate clinical questions to pharmacist.     Attestation  I attest the patient was actively involved in and has agreed to the above plan of care. I attest that the specialty medication(s) addressed above are appropriate for the patient based on my reassessment. If the prescribed therapy is at any point deemed not appropriate based on the current or future assessments, a consultation will be initiated with the patient's specialty care provider to determine the best course of action. The revised plan of therapy will be documented along with any required assessments and/or additional patient education provided.     Electronically signed by Korin Augustin PharmD, 02/05/25, 12:17 PM EST.

## 2025-05-06 ENCOUNTER — SPECIALTY PHARMACY (OUTPATIENT)
Dept: PHARMACY | Facility: TELEHEALTH | Age: 50
End: 2025-05-06
Payer: COMMERCIAL

## 2025-05-06 NOTE — PROGRESS NOTES
Specialty Pharmacy Patient Management Program  Refill Outreach     April was contacted today regarding refills of their medication(s).    Refill Questions      Flowsheet Row Most Recent Value   Changes to allergies? No   Changes to medications? No   New conditions or infections since last clinic visit No   Unplanned office visit, urgent care, ED, or hospital admission in the last 4 weeks  No   How does patient/caregiver feel medication is working? Very good   Financial problems or insurance changes  No   Since the previous refill, were any specialty medication doses or scheduled injections missed or delayed?  No   Does this patient require a clinical escalation to a pharmacist? No            Delivery Questions      Flowsheet Row Most Recent Value   Delivery method  at Pharmacy   Delivery address Other (Enter below)   Other address preferred  at Pharmacy   Number of medications in delivery 1  [ at Pharmacy]   Medication(s) being filled and delivered Ubrogepant (Ubrelvy)  [ at Pharmacy]   Doses left of specialty medications a few   Copay verified? Yes   Copay amount $0.00   Copay form of payment No copayment ($0)   Delivery Date Selection --  [ at Pharmacy]   Signature Required No   Do you consent to receive electronic handouts?  Yes                 Follow-up: 21 day(s)     Judith Reynolds, Pharmacy Technician  5/6/2025  09:14 EDT

## 2025-07-29 ENCOUNTER — SPECIALTY PHARMACY (OUTPATIENT)
Dept: PHARMACY | Facility: TELEHEALTH | Age: 50
End: 2025-07-29
Payer: COMMERCIAL

## 2025-07-29 NOTE — PROGRESS NOTES
Specialty Pharmacy Patient Management Program  Call Center Refill Outreach      April is a 50 y.o. female contacted today regarding refills of her medication(s).    Specialty medication(s) and dose(s) confirmed: Ubrelvy 100 mg tablet  Other medications being refilled: none    Refill Questions      Flowsheet Row Most Recent Value   Changes to allergies? No   Changes to medications? No   New conditions or infections since last clinic visit No   Unplanned office visit, urgent care, ED, or hospital admission in the last 4 weeks  No   How does patient/caregiver feel medication is working? Very good   Financial problems or insurance changes  No   Since the previous refill, were any specialty medication doses or scheduled injections missed or delayed?  No  [PRN use]   Does this patient require a clinical escalation to a pharmacist? No            Delivery Questions      Flowsheet Row Most Recent Value   Delivery method  at Pharmacy   Delivery address verified with patient/caregiver? No  [[facility]]   Delivery address Other (Enter below)   Other address preferred n/a   Number of medications in delivery 1   Medication(s) being filled and delivered Ubrogepant (Ubrelvy)   Doses left of specialty medications unsure, at work   Copay verified? Yes   Copay amount $0.00   Copay form of payment No copayment ($0)   Delivery Date Selection 07/30/25   Signature Required No   Do you consent to receive electronic handouts?  Yes                   Follow-up: 21 days     Korin Augustin RPH  Specialty Pharmacist  7/29/2025  12:45 EDT

## 2025-07-29 NOTE — PROGRESS NOTES
Specialty Pharmacy Patient Management Program  Reassessment     Marisela Farah is a 50 y.o. female with migraine and enrolled in the Patient Management program offered by [facility]. A follow-up outreach was conducted, including assessment of continued therapy appropriateness, medication adherence, and side effect incidence and management for Ubrelvy 100 mg tablet.     Changes to Insurance Coverage or Financial Support  none    Relevant Past Medical History and Comorbidities  Relevant medical history and concomitant health conditions were discussed with the patient. The patient's chart has been reviewed for relevant past medical history and comorbid health conditions and updated as necessary.   Past Medical History:   Diagnosis Date    Anxiety     Hyperlipidemia 2022    Hypertension 2022    Irritable bowel syndrome 2001     Social History     Socioeconomic History    Marital status:    Tobacco Use    Smoking status: Never     Passive exposure: Never    Smokeless tobacco: Never    Tobacco comments:     NA   Vaping Use    Vaping status: Never Used   Substance and Sexual Activity    Alcohol use: No    Drug use: Never    Sexual activity: Yes     Partners: Male     Birth control/protection: None     Problem list reviewed by Korin Augustin, PharmD on 7/29/2025 at 12:45 PM    Allergies  Known allergies and reactions were discussed with the patient. The patient's chart has been reviewed for allergy information and updated as necessary.   Allergies   Allergen Reactions    Sulfa Antibiotics Rash and Itching     Allergies reviewed by Korin Augustin, PharmD on 7/29/2025 at 12:44 PM    Relevant Laboratory Values  Lab Results   Component Value Date    GLUCOSE 163 (H) 07/18/2024    CALCIUM 9.7 07/18/2024     07/18/2024    K 2.4 (C) 07/18/2024    CO2 28.3 07/18/2024    CL 97 (L) 07/18/2024    BUN 23 (H) 07/18/2024    CREATININE 1.13 (H) 07/18/2024    BCR 20.4 07/18/2024    ANIONGAP 13.7  07/18/2024     Lab Results   Component Value Date    WBC 10.47 07/18/2024    HGB 15.1 07/18/2024    HCT 44.4 07/18/2024    MCV 89.9 07/18/2024     07/18/2024     Lab Value Review  The above lab values have been reviewed; the following specialty medication(s) dose adjustment(s) are recommended: none.    Current Medication List  This medication list has been reviewed with the patient and evaluated for any interactions or necessary modifications/recommendations, and updated to include all prescription medications, OTC medications, and supplements the patient is currently taking. This list reflects what is contained in the patient's profile, which has also been marked as reviewed to communicate to other providers it is the most up to date version of the patient's current medication therapy.     Current Outpatient Medications:     Bempedoic Acid (Nexletol) 180 MG tablet, Take 1 tablet by mouth every day for cholesterol., Disp: 90 tablet, Rfl: 3    chlorthalidone (HYGROTON) 25 MG tablet, Take 1 tablet by mouth Daily., Disp: , Rfl:     chlorthalidone (HYGROTON) 25 MG tablet, Take 1 tablet by mouth Daily., Disp: 30 tablet, Rfl: 11    clotrimazole-betamethasone (LOTRISONE) 1-0.05 % cream, clotrimazole-betamethasone 1 %-0.05 % topical cream  APPLY TO THE AFFECTED AND SURROUNDING AREAS OF SKIN BY TOPICAL ROUTE 2 TIMES PER DAY IN THE MORNING AND EVENING FOR 2 WEEKS, Disp: , Rfl:     cyclobenzaprine (FLEXERIL) 10 MG tablet, Take 1 tablet by mouth 3 (Three) Times a Day As Needed., Disp: 30 tablet, Rfl: 1    cyclobenzaprine (FLEXERIL) 10 MG tablet, Take 1 tablet by mouth 3 times a day as needed., Disp: 30 tablet, Rfl: 0    FLUoxetine (PROzac) 40 MG capsule, Take 1 capsule by mouth Daily., Disp: 90 capsule, Rfl: 2    hydrOXYzine (ATARAX) 25 MG tablet, Take 1 tablet by mouth 3 (Three) Times a Day As Needed., Disp: 90 tablet, Rfl: 1    Lidocaine Viscous HCl (XYLOCAINE) 2 % solution, Take 15 mL by mouth Every 3 (Three) Hours.,  Disp: 100 mL, Rfl: 1    nystatin (MYCOSTATIN) 156668 UNIT/GM powder, nystatin 100,000 unit/gram topical powder  APPLY TO THE AFFECTED AREA(S) BY TOPICAL ROUTE 2 TIMES PER DAY, Disp: , Rfl:     potassium chloride (KLOR-CON M20) 20 MEQ CR tablet, Take 2 tablets by mouth Daily for low potassium., Disp: 60 tablet, Rfl: 3    rOPINIRole (REQUIP) 2 MG tablet, Take 1 tablet by mouth every night at bedtime., Disp: 90 tablet, Rfl: 3    Tenapanor HCl (Ibsrela) 50 MG tablet, Take 1 tablet by mouth 2 (Two) Times a Day., Disp: 60 tablet, Rfl: 11    ubrogepant (Ubrelvy) 100 MG tablet, Take 1 tablet by mouth at onset of headache. may repeat in 2 hours if needed. max 2 tablets per 24 hours, Disp: 10 tablet, Rfl: 1    ubrogepant (Ubrelvy) 100 MG tablet, take 1 tablet at the onset of headache, repeat x 1 in 2 hours if symptoms unrelieved. Do not exceed more than 2 doses in 2 hours., Disp: 10 tablet, Rfl: 1    ubrogepant (Ubrelvy) 100 MG tablet, take 1 tablet at the onset of headache, repeat x 1 in 2 hours if symptoms unrelieved. Do not exceed more than 2 doses in 2 hours., Disp: 10 tablet, Rfl: 1    valACYclovir (VALTREX) 500 MG tablet, Take 1 tablet by mouth Daily., Disp: 30 tablet, Rfl: 11  Medicines reviewed by Korin Augustin, PharmD on 7/29/2025 at 12:45 PM    Drug Interactions  none     Adverse Drug Reactions  Medication tolerability: Tolerating with no to minimal ADRs  Medication plan: Continue therapy with normal follow-up  Plan for ADR Management: none    Hospitalizations and Urgent Care Since Last Assessment  Hospitalizations or Admissions: none  ED Visits: none  Urgent Office Visits: none     Adherence, Self-Administration, and Current Therapy Problems  Adherence related to the patient's specialty therapy was discussed with the patient. The Adherence segment of this outreach has been reviewed and updated.     Adherence Questions  Linked Medication(s) Assessed: Ubrogepant (Ubrelvy)  On average, how many doses/injections does  the patient miss per month?: 0 (PRN use)  What are the identified reasons for non-adherence or missed doses? : no problems identified  What is the estimated medication adherence level?: %  Based on the patient/caregiver response and refill history, does this patient require an MTP to track adherence improvements?: no    Additional Barriers to Patient Self-Administration: none  Methods for Supporting Patient Self-Administration: n/a    Open Medication Therapy Problems  No medication therapy recommendations to display    Goals of Therapy  Goals related to the patient's specialty therapy were discussed with the patient. The Patient Goals segment of this outreach has been reviewed and updated.   Goals Addressed Today        Specialty Pharmacy General Goal      Decrease frequency, severity and duration of migraine attacks by at least 50%    7/29/25 Patient tolerating well, no ADRs to report. Still working well for acute attack treatment.    2/5/25 Patient tolerating medication well, no ADRs to report. She does not have to use often for acute treatment. However, when she does take it works well to decrease duration, severity of migraine.              Progress Toward Meeting Patient-Identified Goals of Therapy: On Track  New Patient-Identified Goals, If Applicable:     Progress Toward Meeting Clinical Goals or Therapeutic Targets: On Track  New Clinical Goals or Therapeutic Targets, If Applicable:     Quality of Life Assessment   Quality of Life related to the patient's enrollment in the patient management program and services provided was discussed with the patient. The QOL segment of this outreach has been reviewed and updated.  Quality of Life Improvement Scale: 7-Somewhat better    Reassessment Plan & Follow-Up  Medication Therapy Changes: none  Additional Plans, Therapy Recommendations, or Therapy Problems to Be Addressed: none   Pharmacist to perform regular reassessments no more than (6) months from the  previous assessment.  Care Coordinator to set up future refill outreaches, coordinate prescription delivery, and escalate clinical questions to pharmacist.     Attestation  I attest the patient was actively involved in and has agreed to the above plan of care. I attest that the specialty medication(s) addressed above are appropriate for the patient based on my reassessment. If the prescribed therapy is at any point deemed not appropriate based on the current or future assessments, a consultation will be initiated with the patient's specialty care provider to determine the best course of action. The revised plan of therapy will be documented along with any required assessments and/or additional patient education provided.     Electronically signed by Korin Augustin PharmD, 07/29/25, 12:46 PM EDT.